# Patient Record
Sex: MALE | Race: WHITE | Employment: FULL TIME | URBAN - NONMETROPOLITAN AREA
[De-identification: names, ages, dates, MRNs, and addresses within clinical notes are randomized per-mention and may not be internally consistent; named-entity substitution may affect disease eponyms.]

---

## 2019-01-01 ENCOUNTER — OFFICE VISIT (OUTPATIENT)
Dept: PODIATRY | Age: 54
End: 2019-01-01

## 2019-01-01 VITALS — BODY MASS INDEX: 44.1 KG/M2 | WEIGHT: 315 LBS | HEIGHT: 71 IN

## 2019-01-01 VITALS
SYSTOLIC BLOOD PRESSURE: 136 MMHG | BODY MASS INDEX: 42.7 KG/M2 | HEIGHT: 71 IN | HEART RATE: 80 BPM | OXYGEN SATURATION: 97 % | WEIGHT: 305 LBS | DIASTOLIC BLOOD PRESSURE: 72 MMHG

## 2019-01-01 VITALS — HEIGHT: 71 IN | WEIGHT: 315 LBS | BODY MASS INDEX: 44.1 KG/M2

## 2019-01-01 DIAGNOSIS — L03.032 CELLULITIS OF GREAT TOE OF LEFT FOOT: ICD-10-CM

## 2019-01-01 DIAGNOSIS — S90.422A BLISTER (NONTHERMAL), LEFT GREAT TOE, INITIAL ENCOUNTER: Primary | ICD-10-CM

## 2019-01-01 DIAGNOSIS — E11.42 DIABETIC POLYNEUROPATHY ASSOCIATED WITH TYPE 2 DIABETES MELLITUS (HCC): ICD-10-CM

## 2019-01-01 PROCEDURE — 99213 OFFICE O/P EST LOW 20 MIN: CPT | Performed by: PODIATRIST

## 2019-01-01 RX ORDER — LISINOPRIL 40 MG/1
TABLET ORAL
COMMUNITY

## 2019-01-01 RX ORDER — AMOXICILLIN AND CLAVULANATE POTASSIUM 875; 125 MG/1; MG/1
1 TABLET, FILM COATED ORAL 2 TIMES DAILY
Qty: 28 TABLET | Refills: 0 | Status: SHIPPED | OUTPATIENT
Start: 2019-01-01 | End: 2019-01-01

## 2019-01-01 RX ORDER — AMLODIPINE BESYLATE 5 MG/1
TABLET ORAL
COMMUNITY

## 2019-01-01 RX ORDER — GABAPENTIN 300 MG/1
CAPSULE ORAL
COMMUNITY

## 2019-01-01 SDOH — HEALTH STABILITY: MENTAL HEALTH: HOW MANY STANDARD DRINKS CONTAINING ALCOHOL DO YOU HAVE ON A TYPICAL DAY?: 1 OR 2

## 2019-01-01 SDOH — HEALTH STABILITY: MENTAL HEALTH: HOW OFTEN DO YOU HAVE A DRINK CONTAINING ALCOHOL?: MONTHLY OR LESS

## 2019-10-29 PROBLEM — S90.422A BLISTER (NONTHERMAL), LEFT GREAT TOE, INITIAL ENCOUNTER: Status: ACTIVE | Noted: 2019-01-01

## 2019-10-29 PROBLEM — E11.42 DIABETIC POLYNEUROPATHY ASSOCIATED WITH TYPE 2 DIABETES MELLITUS (HCC): Status: ACTIVE | Noted: 2019-01-01

## 2020-01-01 ENCOUNTER — OFFICE VISIT (OUTPATIENT)
Dept: PODIATRY | Age: 55
End: 2020-01-01

## 2020-01-01 ENCOUNTER — APPOINTMENT (OUTPATIENT)
Dept: GENERAL RADIOLOGY | Age: 55
DRG: 177 | End: 2020-01-01
Attending: INTERNAL MEDICINE
Payer: OTHER GOVERNMENT

## 2020-01-01 ENCOUNTER — HOSPITAL ENCOUNTER (INPATIENT)
Age: 55
LOS: 1 days | DRG: 177 | End: 2020-06-02
Attending: INTERNAL MEDICINE | Admitting: INTERNAL MEDICINE
Payer: OTHER GOVERNMENT

## 2020-01-01 VITALS
DIASTOLIC BLOOD PRESSURE: 84 MMHG | BODY MASS INDEX: 44.1 KG/M2 | HEIGHT: 71 IN | TEMPERATURE: 98.1 F | SYSTOLIC BLOOD PRESSURE: 136 MMHG | HEART RATE: 89 BPM | WEIGHT: 315 LBS | OXYGEN SATURATION: 97 %

## 2020-01-01 VITALS
HEART RATE: 80 BPM | TEMPERATURE: 97.9 F | HEIGHT: 71 IN | WEIGHT: 315 LBS | OXYGEN SATURATION: 95 % | BODY MASS INDEX: 44.1 KG/M2

## 2020-01-01 VITALS
BODY MASS INDEX: 44.1 KG/M2 | BODY MASS INDEX: 44.1 KG/M2 | HEART RATE: 90 BPM | OXYGEN SATURATION: 94 % | DIASTOLIC BLOOD PRESSURE: 82 MMHG | HEIGHT: 71 IN | HEART RATE: 78 BPM | OXYGEN SATURATION: 96 % | TEMPERATURE: 98.3 F | HEIGHT: 71 IN | WEIGHT: 315 LBS | SYSTOLIC BLOOD PRESSURE: 138 MMHG | TEMPERATURE: 98 F | WEIGHT: 315 LBS

## 2020-01-01 VITALS — TEMPERATURE: 97.6 F

## 2020-01-01 VITALS
DIASTOLIC BLOOD PRESSURE: 82 MMHG | HEIGHT: 71 IN | SYSTOLIC BLOOD PRESSURE: 136 MMHG | HEART RATE: 79 BPM | BODY MASS INDEX: 44.1 KG/M2 | TEMPERATURE: 97.7 F | WEIGHT: 315 LBS | OXYGEN SATURATION: 94 %

## 2020-01-01 VITALS
BODY MASS INDEX: 44.1 KG/M2 | HEART RATE: 79 BPM | TEMPERATURE: 98 F | SYSTOLIC BLOOD PRESSURE: 128 MMHG | WEIGHT: 315 LBS | DIASTOLIC BLOOD PRESSURE: 82 MMHG | HEIGHT: 71 IN | OXYGEN SATURATION: 94 %

## 2020-01-01 VITALS — BODY MASS INDEX: 44.1 KG/M2 | WEIGHT: 315 LBS | HEIGHT: 71 IN

## 2020-01-01 VITALS
WEIGHT: 315 LBS | DIASTOLIC BLOOD PRESSURE: 43 MMHG | BODY MASS INDEX: 46.12 KG/M2 | SYSTOLIC BLOOD PRESSURE: 74 MMHG | OXYGEN SATURATION: 100 %

## 2020-01-01 VITALS
BODY MASS INDEX: 44.1 KG/M2 | HEIGHT: 71 IN | TEMPERATURE: 97.7 F | WEIGHT: 315 LBS | DIASTOLIC BLOOD PRESSURE: 84 MMHG | SYSTOLIC BLOOD PRESSURE: 138 MMHG

## 2020-01-01 VITALS — WEIGHT: 315 LBS | HEIGHT: 71 IN | BODY MASS INDEX: 44.1 KG/M2

## 2020-01-01 LAB
AADO2: 555 MMHG
AADO2: 564.4 MMHG
AADO2: 582.8 MMHG
ALBUMIN SERPL-MCNC: 2.6 G/DL (ref 3.5–5.2)
ALP BLD-CCNC: 193 U/L (ref 40–129)
ALT SERPL-CCNC: 45 U/L (ref 0–40)
ANION GAP SERPL CALCULATED.3IONS-SCNC: 28 MMOL/L (ref 7–16)
ANISOCYTOSIS: ABNORMAL
APTT: 45 SEC (ref 24.5–35.1)
AST SERPL-CCNC: 90 U/L (ref 0–39)
B.E.: -18.6 MMOL/L (ref -3–3)
B.E.: -19.5 MMOL/L (ref -3–3)
B.E.: -20.3 MMOL/L (ref -3–3)
BASOPHILS ABSOLUTE: 0 E9/L (ref 0–0.2)
BASOPHILS RELATIVE PERCENT: 0.3 % (ref 0–2)
BILIRUB SERPL-MCNC: 0.4 MG/DL (ref 0–1.2)
BUN BLDV-MCNC: 29 MG/DL (ref 6–20)
C-REACTIVE PROTEIN: 3.8 MG/DL (ref 0–0.4)
CALCIUM IONIZED: 1.13 MMOL/L (ref 1.15–1.33)
CALCIUM SERPL-MCNC: 7.8 MG/DL (ref 8.6–10.2)
CHLORIDE BLD-SCNC: 95 MMOL/L (ref 98–107)
CO2: 17 MMOL/L (ref 22–29)
COHB: 1.7 % (ref 0–1.5)
COHB: 1.7 % (ref 0–1.5)
COHB: 1.8 % (ref 0–1.5)
COMMENT: ABNORMAL
CREAT SERPL-MCNC: 2.1 MG/DL (ref 0.7–1.2)
CRITICAL: ABNORMAL
DATE ANALYZED: ABNORMAL
DATE OF COLLECTION: ABNORMAL
EOSINOPHILS ABSOLUTE: 0 E9/L (ref 0.05–0.5)
EOSINOPHILS RELATIVE PERCENT: 0 % (ref 0–6)
FERRITIN: 3741 NG/ML
FIBRINOGEN: 173 MG/DL (ref 225–540)
FIO2: 100 %
GFR AFRICAN AMERICAN: 40
GFR NON-AFRICAN AMERICAN: 33 ML/MIN/1.73
GLUCOSE BLD-MCNC: 411 MG/DL (ref 74–99)
HBA1C MFR BLD: 8.8 % (ref 4–5.6)
HCO3: 14.7 MMOL/L (ref 22–26)
HCO3: 15.4 MMOL/L (ref 22–26)
HCO3: 17.6 MMOL/L (ref 22–26)
HCT VFR BLD CALC: 55.7 % (ref 37–54)
HEMOGLOBIN: 16 G/DL (ref 12.5–16.5)
HHB: 64.1 % (ref 0–5)
HHB: 66.4 % (ref 0–5)
HHB: 72.5 % (ref 0–5)
INR BLD: 1.5
LAB: ABNORMAL
LACTATE DEHYDROGENASE: 1281 U/L (ref 135–225)
LACTIC ACID: 14.4 MMOL/L (ref 0.5–2.2)
LYMPHOCYTES ABSOLUTE: 0.3 E9/L (ref 1.5–4)
LYMPHOCYTES RELATIVE PERCENT: 2.6 % (ref 20–42)
Lab: ABNORMAL
MCH RBC QN AUTO: 28.2 PG (ref 26–35)
MCHC RBC AUTO-ENTMCNC: 28.7 % (ref 32–34.5)
MCV RBC AUTO: 98.1 FL (ref 80–99.9)
METAMYELOCYTES RELATIVE PERCENT: 0.9 % (ref 0–1)
METHB: 0 % (ref 0–1.5)
METHB: 0.2 % (ref 0–1.5)
METHB: 0.3 % (ref 0–1.5)
MODE: AC
MONOCYTES ABSOLUTE: 1.19 E9/L (ref 0.1–0.95)
MONOCYTES RELATIVE PERCENT: 12.3 % (ref 2–12)
MYELOCYTE PERCENT: 1.8 % (ref 0–0)
NEUTROPHILS ABSOLUTE: 8.42 E9/L (ref 1.8–7.3)
NEUTROPHILS RELATIVE PERCENT: 82.5 % (ref 43–80)
NUCLEATED RED BLOOD CELLS: 1.8 /100 WBC
O2 CONTENT: 5.4 ML/DL
O2 CONTENT: 7 ML/DL
O2 CONTENT: 7.3 ML/DL
O2 SATURATION: 26 % (ref 92–98.5)
O2 SATURATION: 32.2 % (ref 92–98.5)
O2 SATURATION: 34.8 % (ref 92–98.5)
O2HB: 25.5 % (ref 94–97)
O2HB: 31.6 % (ref 94–97)
O2HB: 34.2 % (ref 94–97)
OPERATOR ID: 1632
OPERATOR ID: 1632
OPERATOR ID: 9689
PATIENT TEMP: 37 C
PCO2: 106.3 MMHG (ref 35–45)
PCO2: 76.2 MMHG (ref 35–45)
PCO2: 91.9 MMHG (ref 35–45)
PDW BLD-RTO: 13 FL (ref 11.5–15)
PEEP/CPAP: 15 CMH2O
PEEP/CPAP: 18 CMH2O
PEEP/CPAP: 18 CMH2O
PFO2: 0.27 MMHG/%
PFO2: 0.29 MMHG/%
PFO2: 0.32 MMHG/%
PH BLOOD GAS: 6.84 (ref 7.35–7.45)
PH BLOOD GAS: 6.84 (ref 7.35–7.45)
PH BLOOD GAS: 6.9 (ref 7.35–7.45)
PHOSPHORUS: 7.4 MG/DL (ref 2.5–4.5)
PLATELET # BLD: 142 E9/L (ref 130–450)
PMV BLD AUTO: 10.6 FL (ref 7–12)
PO2: 26.7 MMHG (ref 75–100)
PO2: 29 MMHG (ref 75–100)
PO2: 31.7 MMHG (ref 75–100)
POIKILOCYTES: ABNORMAL
POLYCHROMASIA: ABNORMAL
POTASSIUM REFLEX MAGNESIUM: 5.5 MMOL/L (ref 3.5–5)
POTASSIUM SERPL-SCNC: 4.79 MMOL/L (ref 3.5–5)
POTASSIUM SERPL-SCNC: 5.72 MMOL/L (ref 3.5–5)
POTASSIUM SERPL-SCNC: 6.64 MMOL/L (ref 3.5–5)
PROCALCITONIN: 60.78 NG/ML (ref 0–0.08)
PROTHROMBIN TIME: 17.6 SEC (ref 9.3–12.4)
RBC # BLD: 5.68 E12/L (ref 3.8–5.8)
RI(T): 17.8
RI(T): ABNORMAL
RI(T): ABNORMAL
RR MECHANICAL: 24 B/MIN
RR MECHANICAL: 30 B/MIN
RR MECHANICAL: 30 B/MIN
SCHISTOCYTES: ABNORMAL
SODIUM BLD-SCNC: 140 MMOL/L (ref 132–146)
SOURCE, BLOOD GAS: ABNORMAL
THB: 15 G/DL (ref 11.5–16.5)
THB: 15.2 G/DL (ref 11.5–16.5)
THB: 15.8 G/DL (ref 11.5–16.5)
TIME ANALYZED: 512
TIME ANALYZED: 556
TIME ANALYZED: 614
TOTAL PROTEIN: 5.6 G/DL (ref 6.4–8.3)
VT MECHANICAL: 500 ML
WBC # BLD: 9.9 E9/L (ref 4.5–11.5)

## 2020-01-01 PROCEDURE — 99213 OFFICE O/P EST LOW 20 MIN: CPT | Performed by: PODIATRIST

## 2020-01-01 PROCEDURE — 6360000002 HC RX W HCPCS: Performed by: INTERNAL MEDICINE

## 2020-01-01 PROCEDURE — 85384 FIBRINOGEN ACTIVITY: CPT

## 2020-01-01 PROCEDURE — 80053 COMPREHEN METABOLIC PANEL: CPT

## 2020-01-01 PROCEDURE — 29580 STRAPPING UNNA BOOT: CPT | Performed by: PODIATRIST

## 2020-01-01 PROCEDURE — 2500000003 HC RX 250 WO HCPCS

## 2020-01-01 PROCEDURE — 83615 LACTATE (LD) (LDH) ENZYME: CPT

## 2020-01-01 PROCEDURE — 85610 PROTHROMBIN TIME: CPT

## 2020-01-01 PROCEDURE — 83605 ASSAY OF LACTIC ACID: CPT

## 2020-01-01 PROCEDURE — 84100 ASSAY OF PHOSPHORUS: CPT

## 2020-01-01 PROCEDURE — 2580000003 HC RX 258: Performed by: INTERNAL MEDICINE

## 2020-01-01 PROCEDURE — 83036 HEMOGLOBIN GLYCOSYLATED A1C: CPT

## 2020-01-01 PROCEDURE — 86140 C-REACTIVE PROTEIN: CPT

## 2020-01-01 PROCEDURE — 82728 ASSAY OF FERRITIN: CPT

## 2020-01-01 PROCEDURE — 2000000000 HC ICU R&B

## 2020-01-01 PROCEDURE — 84132 ASSAY OF SERUM POTASSIUM: CPT

## 2020-01-01 PROCEDURE — 84145 PROCALCITONIN (PCT): CPT

## 2020-01-01 PROCEDURE — 85730 THROMBOPLASTIN TIME PARTIAL: CPT

## 2020-01-01 PROCEDURE — 85025 COMPLETE CBC W/AUTO DIFF WBC: CPT

## 2020-01-01 PROCEDURE — 99223 1ST HOSP IP/OBS HIGH 75: CPT | Performed by: INTERNAL MEDICINE

## 2020-01-01 PROCEDURE — 82805 BLOOD GASES W/O2 SATURATION: CPT

## 2020-01-01 PROCEDURE — 6360000002 HC RX W HCPCS

## 2020-01-01 PROCEDURE — 2500000003 HC RX 250 WO HCPCS: Performed by: INTERNAL MEDICINE

## 2020-01-01 PROCEDURE — 82330 ASSAY OF CALCIUM: CPT

## 2020-01-01 PROCEDURE — 04HY32Z INSERTION OF MONITORING DEVICE INTO LOWER ARTERY, PERCUTANEOUS APPROACH: ICD-10-PCS | Performed by: INTERNAL MEDICINE

## 2020-01-01 PROCEDURE — 94002 VENT MGMT INPAT INIT DAY: CPT

## 2020-01-01 PROCEDURE — 36415 COLL VENOUS BLD VENIPUNCTURE: CPT

## 2020-01-01 RX ORDER — PROMETHAZINE HYDROCHLORIDE 25 MG/1
12.5 TABLET ORAL EVERY 6 HOURS PRN
Status: DISCONTINUED | OUTPATIENT
Start: 2020-01-01 | End: 2020-01-01 | Stop reason: HOSPADM

## 2020-01-01 RX ORDER — POLYETHYLENE GLYCOL 3350 17 G/17G
17 POWDER, FOR SOLUTION ORAL DAILY PRN
Status: DISCONTINUED | OUTPATIENT
Start: 2020-01-01 | End: 2020-01-01

## 2020-01-01 RX ORDER — DEXTROSE MONOHYDRATE 25 G/50ML
12.5 INJECTION, SOLUTION INTRAVENOUS PRN
Status: DISCONTINUED | OUTPATIENT
Start: 2020-01-01 | End: 2020-01-01

## 2020-01-01 RX ORDER — PHENYLEPHRINE HYDROCHLORIDE 10 MG/ML
INJECTION INTRAVENOUS
Status: COMPLETED
Start: 2020-01-01 | End: 2020-01-01

## 2020-01-01 RX ORDER — HEPARIN SODIUM 1000 [USP'U]/ML
5000 INJECTION, SOLUTION INTRAVENOUS; SUBCUTANEOUS PRN
Status: DISCONTINUED | OUTPATIENT
Start: 2020-01-01 | End: 2020-01-01

## 2020-01-01 RX ORDER — HEPARIN SODIUM 10000 [USP'U]/100ML
12 INJECTION, SOLUTION INTRAVENOUS CONTINUOUS
Status: DISCONTINUED | OUTPATIENT
Start: 2020-01-01 | End: 2020-01-01 | Stop reason: HOSPADM

## 2020-01-01 RX ORDER — VASOPRESSIN 20 U/ML
INJECTION PARENTERAL
Status: COMPLETED
Start: 2020-01-01 | End: 2020-01-01

## 2020-01-01 RX ORDER — HEPARIN SODIUM 1000 [USP'U]/ML
10000 INJECTION, SOLUTION INTRAVENOUS; SUBCUTANEOUS ONCE
Status: DISCONTINUED | OUTPATIENT
Start: 2020-01-01 | End: 2020-01-01

## 2020-01-01 RX ORDER — HEPARIN SODIUM 1000 [USP'U]/ML
60 INJECTION, SOLUTION INTRAVENOUS; SUBCUTANEOUS PRN
Status: DISCONTINUED | OUTPATIENT
Start: 2020-01-01 | End: 2020-01-01 | Stop reason: HOSPADM

## 2020-01-01 RX ORDER — EPINEPHRINE 1 MG/ML
INJECTION, SOLUTION, CONCENTRATE INTRAVENOUS
Status: COMPLETED
Start: 2020-01-01 | End: 2020-01-01

## 2020-01-01 RX ORDER — SODIUM CHLORIDE 0.9 % (FLUSH) 0.9 %
10 SYRINGE (ML) INJECTION EVERY 12 HOURS SCHEDULED
Status: DISCONTINUED | OUTPATIENT
Start: 2020-01-01 | End: 2020-01-01 | Stop reason: HOSPADM

## 2020-01-01 RX ORDER — ACETAMINOPHEN 650 MG/1
650 SUPPOSITORY RECTAL EVERY 6 HOURS PRN
Status: DISCONTINUED | OUTPATIENT
Start: 2020-01-01 | End: 2020-01-01 | Stop reason: HOSPADM

## 2020-01-01 RX ORDER — NICOTINE POLACRILEX 4 MG
15 LOZENGE BUCCAL PRN
Status: DISCONTINUED | OUTPATIENT
Start: 2020-01-01 | End: 2020-01-01

## 2020-01-01 RX ORDER — CALCIUM CHLORIDE 100 MG/ML
2 INJECTION INTRAVENOUS; INTRAVENTRICULAR ONCE
Status: DISCONTINUED | OUTPATIENT
Start: 2020-01-01 | End: 2020-01-01 | Stop reason: SDUPTHER

## 2020-01-01 RX ORDER — MAGNESIUM SULFATE IN WATER 40 MG/ML
2 INJECTION, SOLUTION INTRAVENOUS PRN
Status: DISCONTINUED | OUTPATIENT
Start: 2020-01-01 | End: 2020-01-01

## 2020-01-01 RX ORDER — CALCIUM CHLORIDE 100 MG/ML
2 INJECTION INTRAVENOUS; INTRAVENTRICULAR ONCE
Status: COMPLETED | OUTPATIENT
Start: 2020-01-01 | End: 2020-01-01

## 2020-01-01 RX ORDER — ONDANSETRON 2 MG/ML
4 INJECTION INTRAMUSCULAR; INTRAVENOUS EVERY 6 HOURS PRN
Status: DISCONTINUED | OUTPATIENT
Start: 2020-01-01 | End: 2020-01-01 | Stop reason: HOSPADM

## 2020-01-01 RX ORDER — HEPARIN SODIUM 1000 [USP'U]/ML
30 INJECTION, SOLUTION INTRAVENOUS; SUBCUTANEOUS PRN
Status: DISCONTINUED | OUTPATIENT
Start: 2020-01-01 | End: 2020-01-01 | Stop reason: HOSPADM

## 2020-01-01 RX ORDER — DEXTROSE MONOHYDRATE 50 MG/ML
100 INJECTION, SOLUTION INTRAVENOUS PRN
Status: DISCONTINUED | OUTPATIENT
Start: 2020-01-01 | End: 2020-01-01

## 2020-01-01 RX ORDER — BUDESONIDE 0.5 MG/2ML
0.5 INHALANT ORAL 2 TIMES DAILY
Status: DISCONTINUED | OUTPATIENT
Start: 2020-01-01 | End: 2020-01-01

## 2020-01-01 RX ORDER — POTASSIUM CHLORIDE 7.45 MG/ML
10 INJECTION INTRAVENOUS PRN
Status: DISCONTINUED | OUTPATIENT
Start: 2020-01-01 | End: 2020-01-01

## 2020-01-01 RX ORDER — ARFORMOTEROL TARTRATE 15 UG/2ML
15 SOLUTION RESPIRATORY (INHALATION) 2 TIMES DAILY
Status: DISCONTINUED | OUTPATIENT
Start: 2020-01-01 | End: 2020-01-01

## 2020-01-01 RX ORDER — ACETAMINOPHEN 650 MG/1
650 SUPPOSITORY RECTAL EVERY 6 HOURS PRN
Status: DISCONTINUED | OUTPATIENT
Start: 2020-01-01 | End: 2020-01-01

## 2020-01-01 RX ORDER — ONDANSETRON 2 MG/ML
4 INJECTION INTRAMUSCULAR; INTRAVENOUS EVERY 6 HOURS PRN
Status: DISCONTINUED | OUTPATIENT
Start: 2020-01-01 | End: 2020-01-01

## 2020-01-01 RX ORDER — HEPARIN SODIUM 1000 [USP'U]/ML
60 INJECTION, SOLUTION INTRAVENOUS; SUBCUTANEOUS ONCE
Status: DISCONTINUED | OUTPATIENT
Start: 2020-01-01 | End: 2020-01-01 | Stop reason: HOSPADM

## 2020-01-01 RX ORDER — PROMETHAZINE HYDROCHLORIDE 25 MG/1
12.5 TABLET ORAL EVERY 6 HOURS PRN
Status: DISCONTINUED | OUTPATIENT
Start: 2020-01-01 | End: 2020-01-01

## 2020-01-01 RX ORDER — SODIUM CHLORIDE 0.9 % (FLUSH) 0.9 %
10 SYRINGE (ML) INJECTION EVERY 12 HOURS SCHEDULED
Status: DISCONTINUED | OUTPATIENT
Start: 2020-01-01 | End: 2020-01-01

## 2020-01-01 RX ORDER — SODIUM CHLORIDE 0.9 % (FLUSH) 0.9 %
10 SYRINGE (ML) INJECTION PRN
Status: DISCONTINUED | OUTPATIENT
Start: 2020-01-01 | End: 2020-01-01

## 2020-01-01 RX ORDER — ACETAMINOPHEN 325 MG/1
650 TABLET ORAL EVERY 6 HOURS PRN
Status: DISCONTINUED | OUTPATIENT
Start: 2020-01-01 | End: 2020-01-01

## 2020-01-01 RX ORDER — SODIUM CHLORIDE AND POTASSIUM CHLORIDE .9; .15 G/100ML; G/100ML
SOLUTION INTRAVENOUS CONTINUOUS
Status: DISCONTINUED | OUTPATIENT
Start: 2020-01-01 | End: 2020-01-01

## 2020-01-01 RX ORDER — POLYETHYLENE GLYCOL 3350 17 G/17G
17 POWDER, FOR SOLUTION ORAL DAILY PRN
Status: DISCONTINUED | OUTPATIENT
Start: 2020-01-01 | End: 2020-01-01 | Stop reason: HOSPADM

## 2020-01-01 RX ORDER — HEPARIN SODIUM 1000 [USP'U]/ML
10000 INJECTION, SOLUTION INTRAVENOUS; SUBCUTANEOUS PRN
Status: DISCONTINUED | OUTPATIENT
Start: 2020-01-01 | End: 2020-01-01

## 2020-01-01 RX ORDER — ALBUTEROL SULFATE 2.5 MG/3ML
2.5 SOLUTION RESPIRATORY (INHALATION) EVERY 4 HOURS
Status: DISCONTINUED | OUTPATIENT
Start: 2020-01-01 | End: 2020-01-01

## 2020-01-01 RX ORDER — ACETYLCYSTEINE 200 MG/ML
140 SOLUTION ORAL; RESPIRATORY (INHALATION) EVERY 4 HOURS
Status: DISCONTINUED | OUTPATIENT
Start: 2020-01-01 | End: 2020-01-01

## 2020-01-01 RX ORDER — SODIUM CHLORIDE 0.9 % (FLUSH) 0.9 %
10 SYRINGE (ML) INJECTION PRN
Status: DISCONTINUED | OUTPATIENT
Start: 2020-01-01 | End: 2020-01-01 | Stop reason: HOSPADM

## 2020-01-01 RX ORDER — ACETAMINOPHEN 325 MG/1
650 TABLET ORAL EVERY 6 HOURS PRN
Status: DISCONTINUED | OUTPATIENT
Start: 2020-01-01 | End: 2020-01-01 | Stop reason: HOSPADM

## 2020-01-01 RX ORDER — HEPARIN SODIUM 10000 [USP'U]/100ML
18 INJECTION, SOLUTION INTRAVENOUS CONTINUOUS
Status: DISCONTINUED | OUTPATIENT
Start: 2020-01-01 | End: 2020-01-01

## 2020-01-01 RX ADMIN — SODIUM BICARBONATE: 84 INJECTION, SOLUTION INTRAVENOUS at 05:58

## 2020-01-01 RX ADMIN — VASOPRESSIN: 20 INJECTION INTRAVENOUS at 06:02

## 2020-01-01 RX ADMIN — PHENYLEPHRINE HYDROCHLORIDE 300 MCG/MIN: 10 INJECTION, SOLUTION INTRAMUSCULAR; INTRAVENOUS; SUBCUTANEOUS at 05:41

## 2020-01-01 RX ADMIN — Medication: at 05:58

## 2020-01-01 RX ADMIN — PHENYLEPHRINE HYDROCHLORIDE: 10 INJECTION INTRAVENOUS at 05:57

## 2020-01-01 RX ADMIN — EPINEPHRINE: 1 INJECTION, SOLUTION INTRAMUSCULAR; SUBCUTANEOUS at 05:51

## 2020-01-01 RX ADMIN — VASOPRESSIN 0.04 UNITS/MIN: 20 INJECTION INTRAVENOUS at 05:23

## 2020-01-01 RX ADMIN — SODIUM CHLORIDE 2 MCG/KG/MIN: 9 INJECTION, SOLUTION INTRAVENOUS at 05:22

## 2020-01-01 RX ADMIN — SODIUM BICARBONATE 150 MEQ: 84 INJECTION INTRAVENOUS at 05:45

## 2020-01-01 RX ADMIN — EPINEPHRINE 1 MCG/MIN: 1 INJECTION PARENTERAL at 05:25

## 2020-01-01 RX ADMIN — PHENYLEPHRINE HYDROCHLORIDE 100 MCG/MIN: 10 INJECTION, SOLUTION INTRAMUSCULAR; INTRAVENOUS; SUBCUTANEOUS at 05:33

## 2020-01-01 RX ADMIN — CALCIUM CHLORIDE 2 G: 100 INJECTION, SOLUTION INTRAVENOUS; INTRAVENTRICULAR at 05:56

## 2020-01-01 RX ADMIN — SODIUM BICARBONATE 50 MEQ: 84 INJECTION, SOLUTION INTRAVENOUS at 06:02

## 2020-01-01 RX ADMIN — Medication 20 MCG/MIN: at 05:21

## 2020-01-01 RX ADMIN — SODIUM CHLORIDE 2 MCG/KG/MIN: 9 INJECTION, SOLUTION INTRAVENOUS at 05:50

## 2020-01-01 ASSESSMENT — PULMONARY FUNCTION TESTS
PIF_VALUE: 42
PIF_VALUE: 41
PIF_VALUE: 43
PIF_VALUE: 39
PIF_VALUE: 42
PIF_VALUE: 60
PIF_VALUE: 41
PIF_VALUE: 51
PIF_VALUE: 49
PIF_VALUE: 60

## 2020-01-14 NOTE — PROGRESS NOTES
20     Carrie Lopez    : 1965   Sex: male    Age: 49 y.o. Patient's PCP/Provider is:  Portia Kelly MD    Subjective:  Patient is seen today for follow-up regarding continued treatment blister plantar aspect left great toe. Patient has been utilizing the topical medications as recommended. He still has noticed some small amount of serous drainage from the blister site after being on his feet at work. He denies any nausea, vomiting, fever, chills. No other additional issues noted at this time. Chief Complaint   Patient presents with    Wound Check     great left toe blister       ROS:  Const: Positives and pertinent negatives as per HPI. Musculo: Denies symptoms other than stated above. Neuro: Denies symptoms other than stated above. Skin: Denies symptoms other than stated above. Current Medications:    Current Outpatient Medications:     lisinopril (PRINIVIL;ZESTRIL) 40 MG tablet, lisinopril 40 mg tablet  TAKE ONE TABLET BY MOUTH EVERY DAY, Disp: , Rfl:     metFORMIN (GLUCOPHAGE) 500 MG tablet, metformin 500 mg tablet  TAKE ONE TABLET BY MOUTH TWO TIMES A DAY AS DIRECTED, Disp: , Rfl:     amLODIPine (NORVASC) 5 MG tablet, amlodipine 5 mg tablet  TAKE ONE TABLET BY MOUTH EVERY MORNING  AND 1/2 TAB IN THE EVENING, Disp: , Rfl:     gabapentin (NEURONTIN) 300 MG capsule, gabapentin 300 mg capsule  Take 1 capsule 3 times a day by oral route., Disp: , Rfl:     diclofenac sodium 1 % GEL, Apply 2 g topically 2 times daily, Disp: 1 Tube, Rfl: 2    Allergies:  No Known Allergies    Vitals:    20 0804   Weight: (!) 331 lb (150.1 kg)   Height: 5' 11\" (1.803 m)       Exam:  Neurovascular status unchanged. Blister area is improved plantar aspect left great toe. No signs of infection noted left great toe. Adequate range of motion left great toe. Diagnostic Studies:     No results found.       Procedures:    None    Plan Per Assessment  Consuelo Sheridan was seen today for wound

## 2020-01-28 NOTE — PROGRESS NOTES
Patient is in today for 2 week follow up of left great toe wound. Patient says its still draining badly and he now has a blister on the inside of the great toe that showed up about a week ago. pcp is Desmond Worthington MD last ov unknown.
polyneuropathy associated with type 2 diabetes mellitus ([facility] [address]      1. Evaluation and management  2. We did discuss continued treatment options regarding the blister site left great toe. 3. Was advised on getting evaluated for new custom diabetic insoles to help prevent further issues bilateral foot. 4. Patient will be followed up in 2 weeks time or sooner if needed for reevaluation. Seen By:    Aly Bhandari DPM    Electronically signed by Aly Bhandari DPM on 1/28/2020 at 9:41 AM    This note was created using voice recognition software. The note was reviewed however may contain grammatical errors.

## 2020-02-11 PROBLEM — I83.892 VENOUS STASIS ULCER OF LEFT LOWER LEG WITH EDEMA OF LEFT LOWER LEG (HCC): Status: ACTIVE | Noted: 2020-01-01

## 2020-02-11 PROBLEM — L97.929 VENOUS STASIS ULCER OF LEFT LOWER LEG WITH EDEMA OF LEFT LOWER LEG (HCC): Status: ACTIVE | Noted: 2020-01-01

## 2020-02-11 PROBLEM — R60.0 VENOUS STASIS ULCER OF LEFT LOWER LEG WITH EDEMA OF LEFT LOWER LEG (HCC): Status: ACTIVE | Noted: 2020-01-01

## 2020-02-11 PROBLEM — R60.9 VENOUS STASIS ULCER OF LEFT LOWER LEG WITH EDEMA OF LEFT LOWER LEG (HCC): Status: ACTIVE | Noted: 2020-01-01

## 2020-02-11 PROBLEM — L97.922 NON-PRESSURE CHRONIC ULCER OF LEFT LOWER LEG WITH FAT LAYER EXPOSED (HCC): Status: ACTIVE | Noted: 2020-01-01

## 2020-02-11 PROBLEM — I83.029 VENOUS STASIS ULCER OF LEFT LOWER LEG WITH EDEMA OF LEFT LOWER LEG (HCC): Status: ACTIVE | Noted: 2020-01-01

## 2020-02-18 NOTE — PROGRESS NOTES
20     Dasia Gonzalez    : 1965   Sex: male    Age: 49 y.o. Patient's PCP/Provider is:  Ying Montano MD    Subjective:  Patient is seen today for follow-up regarding continued care blister left great toe as well as stasis ulceration left leg. Overall patient is doing well at this time without any additional issues noted. Patient was able to keep the compression dressing intact. Patient denies any nausea, vomiting, fever, chills. Chief Complaint   Patient presents with    Wound Check     left lower extremity and great toe       ROS:  Const: Positives and pertinent negatives as per HPI. Musculo: Denies symptoms other than stated above. Neuro: Denies symptoms other than stated above. Skin: Denies symptoms other than stated above. Current Medications:    Current Outpatient Medications:     lisinopril (PRINIVIL;ZESTRIL) 40 MG tablet, lisinopril 40 mg tablet  TAKE ONE TABLET BY MOUTH EVERY DAY, Disp: , Rfl:     metFORMIN (GLUCOPHAGE) 500 MG tablet, metformin 500 mg tablet  TAKE ONE TABLET BY MOUTH TWO TIMES A DAY AS DIRECTED, Disp: , Rfl:     amLODIPine (NORVASC) 5 MG tablet, amlodipine 5 mg tablet  TAKE ONE TABLET BY MOUTH EVERY MORNING  AND 1/2 TAB IN THE EVENING, Disp: , Rfl:     gabapentin (NEURONTIN) 300 MG capsule, gabapentin 300 mg capsule  Take 1 capsule 3 times a day by oral route., Disp: , Rfl:     diclofenac sodium 1 % GEL, Apply 2 g topically 2 times daily, Disp: 1 Tube, Rfl: 2    Allergies:  No Known Allergies    There were no vitals filed for this visit. Exam:  Neurovascular status unchanged. Blister site is stable plantar aspect left great toe. Ulcerative area is stable lateral aspect left leg. Assertive area measures approximately 1.6 cm in diameter. No undermining of the wound edges noted. No signs of infection noted left lower extremity. Diagnostic Studies:     No results found.       Procedures:    None    Plan Per Assessment  Avel Gruber was seen today for wound

## 2020-03-03 NOTE — PROGRESS NOTES
3/3/20     [employer]    : 1965   Sex: male    Age: 49 y.o. Patient's PCP/Provider is:  Steve Preston MD    Subjective:  Patient is seen today for follow-up regarding continued care ulceration left leg and blister left great toe. Overall the patient is doing much better at this time. He denies any nausea, vomiting, fever, and/or chills. Chief Complaint   Patient presents with    Wound Check     left lower extremity       ROS:  Const: Positives and pertinent negatives as per HPI. Musculo: Denies symptoms other than stated above. Neuro: Denies symptoms other than stated above. Skin: Denies symptoms other than stated above. Current Medications:    Current Outpatient Medications:     Quercetin 250 MG TABS, Take 500 mg by mouth daily, Disp: , Rfl:     lisinopril (PRINIVIL;ZESTRIL) 40 MG tablet, lisinopril 40 mg tablet  TAKE ONE TABLET BY MOUTH EVERY DAY, Disp: , Rfl:     metFORMIN (GLUCOPHAGE) 500 MG tablet, metformin 500 mg tablet  TAKE ONE TABLET BY MOUTH TWO TIMES A DAY AS DIRECTED, Disp: , Rfl:     amLODIPine (NORVASC) 5 MG tablet, amlodipine 5 mg tablet  TAKE ONE TABLET BY MOUTH EVERY MORNING  AND 1/2 TAB IN THE EVENING, Disp: , Rfl:     gabapentin (NEURONTIN) 300 MG capsule, gabapentin 300 mg capsule  Take 1 capsule 3 times a day by oral route., Disp: , Rfl:     diclofenac sodium 1 % GEL, Apply 2 g topically 2 times daily, Disp: 1 Tube, Rfl: 2    Allergies:  No Known Allergies    Vitals:    20 0806   BP: 128/82   Pulse: 79   Temp: 98 °F (36.7 °C)   SpO2: 94%   Weight: (!) 326 lb (147.9 kg)   Height: 5' 11\" (1.803 m)       Exam:  NVS unchanged. Blister area is healed left great toe. Ulceration stable left lateral leg, measures ~ 1.9 x 1.5 x 0.2 cm. No signs of infection noted left leg. Mild edema noted left leg. Diagnostic Studies:     No results found. Procedures:    None    Plan Per Assessment  Phyllis Red was seen today for wound check.     Diagnoses and all orders for this visit:    Venous stasis ulcer of left lower leg with edema of left lower leg (HCC)    Non-pressure chronic ulcer of left lower leg with fat layer exposed ([address]    Blister (nonthermal), left great toe, initial encounter      1. Evaluation and management  2. We will continue with current treatment plans. Patient was advised on purchasing some Tubi- stockings for edema control measures. 3. Patient was advised on continued foot care techniques to prevent reoccurrence of blistering. 4. Patient will be followed up in 2 weeks or sooner if needed. Seen By:    Luis Quintero DPM    Electronically signed by Luis Quintero DPM on 3/3/2020 at 8:27 AM    This note was created using voice recognition software. The note was reviewed however may contain grammatical errors.

## 2020-03-17 NOTE — PROGRESS NOTES
3/17/20     Swathi Shah    : 1965   Sex: male    Age: 49 y.o. Patient's PCP/Provider is:  Vangie Day MD    Subjective:  Patient is seen today for follow-up regarding continued treatment stasis ulceration left lower extremity. Patient has been applying the topical wound dressings as instructed. He denies any nausea, vomiting, fever, chills. He still needs to set up the appointment to get new custom insoles which will help prevent recurrence of the blistering and skin fissuring left great toe. Chief Complaint   Patient presents with    Wound Check     left lower extremity       ROS:  Const: Positives and pertinent negatives as per HPI. Musculo: Denies symptoms other than stated above. Neuro: Denies symptoms other than stated above. Skin: Denies symptoms other than stated above. Current Medications:    Current Outpatient Medications:     Quercetin 250 MG TABS, Take 500 mg by mouth daily, Disp: , Rfl:     sodium hypochlorite (DAKINS) 0.25 % SOLN, Irrigate with 2 mLs as directed daily, Disp: 473 mL, Rfl: 3    lisinopril (PRINIVIL;ZESTRIL) 40 MG tablet, lisinopril 40 mg tablet  TAKE ONE TABLET BY MOUTH EVERY DAY, Disp: , Rfl:     metFORMIN (GLUCOPHAGE) 500 MG tablet, metformin 500 mg tablet  TAKE ONE TABLET BY MOUTH TWO TIMES A DAY AS DIRECTED, Disp: , Rfl:     amLODIPine (NORVASC) 5 MG tablet, amlodipine 5 mg tablet  TAKE ONE TABLET BY MOUTH EVERY MORNING  AND 1/2 TAB IN THE EVENING, Disp: , Rfl:     gabapentin (NEURONTIN) 300 MG capsule, gabapentin 300 mg capsule  Take 1 capsule 3 times a day by oral route., Disp: , Rfl:     diclofenac sodium 1 % GEL, Apply 2 g topically 2 times daily, Disp: 1 Tube, Rfl: 2    Allergies:  No Known Allergies    Vitals:    20 0914   Pulse: 80   Temp: 97.9 °F (36.6 °C)   SpO2: 95%   Weight: (!) 330 lb (149.7 kg)   Height: 5' 11\" (1.803 m)       Exam:  Neurovascular status unchanged. Ulcerative areas are stable lateral aspect left leg.   Area signed by Antonietta Salcido DPM on 3/17/2020 at 10:35 AM    This note was created using voice recognition software. The note was reviewed however may contain grammatical errors.

## 2020-03-30 NOTE — PROGRESS NOTES
healthy granular tissue, without signs of infection noted. Edema noted left lower extremity. Diagnostic Studies:     No results found. Procedures:    None    Plan Per Assessment  Milena Devries was seen today for wound check. Diagnoses and all orders for this visit:    Venous stasis ulcer of left lower leg with edema of left lower leg (HCC)    Non-pressure chronic ulcer of left lower leg with fat layer exposed ([address]    Blister (nonthermal), left great toe, initial encounter    Diabetic polyneuropathy associated with type 2 diabetes mellitus ([facility] [address]      1. Evaluation and management  2. Did discuss continued wound care options with the patient and his girlfriend who does perform dressing changes for him in detail today. No other additional issues noted at this time. 3. It was discussed in detail with the patient proper hygiene for the diabetic foot. They are to get in the habit of looking at their feet or have someone look at them. If they are unable to do daily, they are to look for any signs of redness, blistering, cracking, swelling, drainage, open lesions, etc.  They are to dry in between the toes after each bath or shower gently. The water should be tested with the elbow to prevent burns. The patient is to refrain from soaking their feet unless instructed by myself to do otherwise. They are to refrain from going barefoot. Shoe gear should be inspected for any foreign objects. Shoes should have a deep wide toe box. With any type of shoe, the feet should be inspected for any signs of pressure, i.e. redness, blistering, or open sores. Further instructional guidelines were dispensed to the patient. 4. Patient will be followed up in 1 week's time or sooner if needed for continued wound evaluation and management. Seen By:    Sg Kelly DPM    Electronically signed by Sg Kelly DPM on 3/30/2020 at 9:10 AM    This note was created using voice recognition software.   The note was reviewed however may contain grammatical errors.

## 2020-03-30 NOTE — PROGRESS NOTES
Patient in today for follow up ulcer left lower leg. Patient states the wound has increased drainage. He states there is a new area on the back of the leg.

## 2020-04-06 NOTE — PROGRESS NOTES
20     Clement Sky    : 1965   Sex: male    Age: 49 y.o. Patient's PCP/Provider is:  Karime Zurita MD    Subjective:  Patient is seen today for follow-up regarding continued treatment stasis ulcerations left lower extremity. Patient denies any nausea, vomiting, fever, chills. He has noticed continual improvement with the use of the compression dressing. No other abnormalities noted at this time. Chief Complaint   Patient presents with    Wound Check     left leg    Cellulitis     left leg applied unna boot last visit     Diabetes       ROS:  Const: Positives and pertinent negatives as per HPI. Musculo: Denies symptoms other than stated above. Neuro: Denies symptoms other than stated above. Skin: Denies symptoms other than stated above. Current Medications:    Current Outpatient Medications:     Quercetin 250 MG TABS, Take 500 mg by mouth daily, Disp: , Rfl:     sodium hypochlorite (DAKINS) 0.25 % SOLN, Irrigate with 2 mLs as directed daily, Disp: 473 mL, Rfl: 3    lisinopril (PRINIVIL;ZESTRIL) 40 MG tablet, lisinopril 40 mg tablet  TAKE ONE TABLET BY MOUTH EVERY DAY, Disp: , Rfl:     metFORMIN (GLUCOPHAGE) 500 MG tablet, metformin 500 mg tablet  TAKE ONE TABLET BY MOUTH TWO TIMES A DAY AS DIRECTED, Disp: , Rfl:     amLODIPine (NORVASC) 5 MG tablet, amlodipine 5 mg tablet  TAKE ONE TABLET BY MOUTH EVERY MORNING  AND 1/2 TAB IN THE EVENING, Disp: , Rfl:     gabapentin (NEURONTIN) 300 MG capsule, gabapentin 300 mg capsule  Take 1 capsule 3 times a day by oral route., Disp: , Rfl:     diclofenac sodium 1 % GEL, Apply 2 g topically 2 times daily, Disp: 1 Tube, Rfl: 2    Allergies:  No Known Allergies    Vitals:    20 0800   BP: 138/84   Temp: 97.7 °F (36.5 °C)   TempSrc: Temporal   Weight: (!) 331 lb (150.1 kg)   Height: 5' 11\" (1.803 m)       Exam:    Neurovascular status unchanged. Ulcerative areas are improved lateral aspect left leg and posterior aspect.   No undermining of the wound edges noted. No purulence, odor, erythema or any signs of infection noted left lower extremity. Edema is stable left lower extremity. Diagnostic Studies:     No results found. Procedures:    None    Plan Per Assessment  Deion Whelan was seen today for wound check, cellulitis and diabetes. Diagnoses and all orders for this visit:    Venous stasis ulcer of left lower leg with edema of left lower leg (HCC)    Non-pressure chronic ulcer of left lower leg with fat layer exposed ([address]    Venous insufficiency (chronic) (peripheral)      1. Evaluation and management  2. We did apply a modified compression dressing to the symptomatic left lower extremity. We did recommend continued use of the alginate silver dressing to the ulcerative areas to allow for continued improvement. 3. Patient was advised on elevating his left lower extremity throughout the day to allow for reduced dependent edema issues and allow for appropriate ulcer healing. 4. Patient will be followed up in 1 week's time or sooner if needed for reevaluation. He was advised to call the office with any questions or concerns in the interim. Seen By:    Roel Torres DPM    Electronically signed by Roel Torres DPM on 4/6/2020 at 8:17 AM    This note was created using voice recognition software. The note was reviewed however may contain grammatical errors.

## 2020-04-13 NOTE — PROGRESS NOTES
improve and measures approximately 1.3 x 0.9 x 0.2 cm. No undermining of the wound edges noted. No purulence, odor, erythema or any signs of infection noted left lower extremity. Diagnostic Studies:     No results found. Procedures:    None    Plan Per Assessment  Carey Mcdonald was seen today for wound check. Diagnoses and all orders for this visit:    Venous stasis ulcer of left lower leg with edema of left lower leg (HCC)    Non-pressure chronic ulcer of left lower leg with fat layer exposed ([facility] [address]    Diabetic polyneuropathy associated with type 2 diabetes mellitus ([facility] [address]      1. Evaluation and management  2. A modified compression dressing was reapplied to the ulcerative regions left lower extremity. Patient was advised continued elevation of the left lower extremity throughout the day to allow for continued healing. 3. Patient was advised to continue wearing his graduated compression garments on both lower extremities to allow for continued improvement. 4. Patient will be followed up in 1 week's time or sooner if needed for continued evaluation and care. Seen By:    Jarret Mitchell DPM    Electronically signed by Jarret Mitchell DPM on 4/13/2020 at 8:58 AM    This note was created using voice recognition software. The note was reviewed however may contain grammatical errors.

## 2020-04-20 PROBLEM — I87.2 VENOUS INSUFFICIENCY (CHRONIC) (PERIPHERAL): Status: ACTIVE | Noted: 2020-01-01

## 2020-04-20 NOTE — PROGRESS NOTES
Patient in today for 1 week follow up left ulcer. Patient states he has noticed less drainage on the bandage.

## 2020-06-02 PROBLEM — I10 ESSENTIAL HYPERTENSION: Status: ACTIVE | Noted: 2020-01-01

## 2020-06-02 PROBLEM — E44.0 MODERATE PROTEIN-CALORIE MALNUTRITION (HCC): Status: ACTIVE | Noted: 2020-01-01

## 2020-06-02 PROBLEM — A41.9 SEPTIC SHOCK (HCC): Status: ACTIVE | Noted: 2020-01-01

## 2020-06-02 PROBLEM — J96.01 ACUTE RESPIRATORY FAILURE WITH HYPOXIA (HCC): Status: ACTIVE | Noted: 2020-01-01

## 2020-06-02 PROBLEM — R65.21 SEPTIC SHOCK (HCC): Status: ACTIVE | Noted: 2020-01-01

## 2020-06-02 PROBLEM — E66.09 OBESITY DUE TO EXCESS CALORIES WITH SERIOUS COMORBIDITY: Status: ACTIVE | Noted: 2020-01-01

## 2020-06-02 PROBLEM — E66.01 MORBID OBESITY WITH BMI OF 40.0-44.9, ADULT (HCC): Status: ACTIVE | Noted: 2020-01-01

## 2020-06-02 PROBLEM — J12.82 PNEUMONIA DUE TO COVID-19 VIRUS: Status: ACTIVE | Noted: 2020-01-01

## 2020-06-02 PROBLEM — I21.4 NSTEMI (NON-ST ELEVATED MYOCARDIAL INFARCTION) (HCC): Status: ACTIVE | Noted: 2020-01-01

## 2020-06-02 PROBLEM — U07.1 PNEUMONIA DUE TO COVID-19 VIRUS: Status: ACTIVE | Noted: 2020-01-01

## 2020-06-02 NOTE — PROGRESS NOTES
Patient arrived on unit in unstable condition, code was called shortly after arrival. Due to the emergent nature of the admission, unable to do proper head to toe assessment prior to expiration.

## 2020-06-02 NOTE — H&P
[address] [facility]  History and Physical      CHIEF COMPLAINT:     Acute respiratory failure with hypoxia (HCC)    History of Present Illness:   Patient presented with septic shock and respiratory failure due to COVID-19 pneumonia. He arrived from OSH with a saturation of 11% and quickly went into cardiac arrest.  Prolonged period of CPR was attempted and unfortunately the patient . I did not get a chance to see the patient prior to death. Past Medical History:   Diagnosis Date    Diabetic polyneuropathy associated with type 2 diabetes mellitus ([facility] [address] 10/29/2019    Essential hypertension 2020    Moderate protein-calorie malnutrition ([facility] [address] 2020    Non-pressure [facility] lower [facility]    Obesity due to excess calories with serious comorbidity 2020    Venous insufficiency (chronic) (peripheral) 2020       No past surgical history on file. Medications Prior to Admission:    Medications Prior to Admission: Quercetin 250 MG TABS, Take 500 mg by mouth daily  sodium hypochlorite (DAKINS) 0.25 % SOLN, Irrigate with 2 mLs as directed daily  lisinopril (PRINIVIL;ZESTRIL) 40 MG tablet, lisinopril 40 mg tablet  TAKE ONE TABLET BY MOUTH EVERY DAY  metFORMIN (GLUCOPHAGE) 500 MG tablet, metformin 500 mg tablet  TAKE ONE TABLET BY MOUTH TWO TIMES A DAY AS DIRECTED  amLODIPine (NORVASC) 5 MG tablet, amlodipine 5 mg tablet  TAKE ONE TABLET BY MOUTH EVERY MORNING  AND 1/2 TAB IN THE EVENING  gabapentin (NEURONTIN) 300 MG capsule, gabapentin 300 mg capsule  Take 1 capsule 3 times a day by oral route. diclofenac sodium 1 % GEL, Apply 2 g topically 2 times daily    Note that the patient's home medications were reviewed and the above list is accurate to the best of my knowledge at the time of the exam.    Allergies:    Patient has no known allergies. Social History:    reports that he has been smoking.  He uses smokeless tobacco. He reports current alcohol use. He reports that he does not use drugs. Family History:   Unable to obtain    REVIEW OF SYSTEMS:  As above in the HPI, otherwise negative    PHYSICAL EXAM:    Vitals:  BP (!) 74/43   Pulse (!) 0   Resp (!) 0   Wt (!) 330 lb 11 oz (150 kg)   SpO2 100%   BMI 46.12 kg/m²     . LABS:  All labs reviewed. Of note:  CBC:   Lab Results   Component Value Date    WBC 9.9 2020    RBC 5.68 2020    HGB 16.0 2020    HCT 55.7 2020    MCV 98.1 2020    MCH 28.2 2020    MCHC 28.7 2020    RDW 13.0 2020     2020    MPV 10.6 2020     CMP:    Lab Results   Component Value Date     2020    K 6.64 2020    K 5.5 2020    CL 95 2020    CO2 17 2020    BUN 29 2020    CREATININE 2.1 2020    GFRAA 40 2020    AGRATIO 0.8 2020    LABGLOM 33 2020    GLUCOSE 411 2020    PROT 5.6 2020    LABALBU 2.6 2020    CALCIUM 7.8 2020    BILITOT 0.4 2020    ALKPHOS 193 2020    AST 90 2020    ALT 45 2020     ASSESSMENT/PLAN:  Principal Problem:    Acute respiratory failure with hypoxia (HCC)  Active Problems:    Diabetic polyneuropathy associated with type 2 diabetes mellitus (HCC)    Venous stasis ulcer of left lower leg with edema of left lower leg (HCC)    Non-pressure chronic ulcer of left lower leg with fat layer exposed ([address]    Venous insufficiency (chronic) (peripheral)    Septic shock (HCC)    Uncontrolled type 2 diabetes mellitus ([address]    Essential hypertension    NSTEMI type 2    Pneumonia due to COVID-19 virus    Obesity due to excess calories with serious comorbidity    Moderate protein-calorie malnutrition ([address]    Morbid obesity with BMI of 40.0-44.9, adult ([address]  Resolved Problems:    * No resolved hospital problems. *    Patient was decompensating upon arrival to ICU, went into cardiac arrest.  See ICU notes.   He

## 2020-06-02 NOTE — PROGRESS NOTES
Pt arrived via [facility] with settings of ac/vc of 18, TV of 500, peep of 15, fio2 100@, abg on these settings were 6.94/76/29 from arterial line, spo2 11% on monitor and 32 on ABG SAT.     Dr Monalisa Martin at bedside, vent changes to rate of 30, peep of 18, improvement of SP02 to 66%

## 2020-06-02 NOTE — CONSULTS
services and times documented are independent of procedures and multidisciplinary rounds with Residents. Additionally comprehensive, multidisciplinary rounds were conducted with the MICU team. The case was discussed in detail and plans for care were established. Review of Residents documentation was conducted and revisions were made as appropriate.  I agree with the above documented exam, problem list and plan of   Came from [facility] [address]  4 pressprs  Hard to oxygenate   Discuss with CTS Dr Alonzo Sadler about Ecmo,  Since he is not candidate will continue meds wise   Family later agree limited code  Efren [address]  Pulmonary&Critical Care Medicine   Director of [facility] Director of [address] [place] [address]   Professor Isabella Self
